# Patient Record
Sex: MALE | Race: WHITE | NOT HISPANIC OR LATINO | URBAN - METROPOLITAN AREA
[De-identification: names, ages, dates, MRNs, and addresses within clinical notes are randomized per-mention and may not be internally consistent; named-entity substitution may affect disease eponyms.]

---

## 2017-12-12 ENCOUNTER — OFFICE VISIT (OUTPATIENT)
Dept: URGENT CARE | Facility: CLINIC | Age: 27
End: 2017-12-12

## 2017-12-12 VITALS
HEART RATE: 70 BPM | DIASTOLIC BLOOD PRESSURE: 64 MMHG | WEIGHT: 171.2 LBS | SYSTOLIC BLOOD PRESSURE: 140 MMHG | OXYGEN SATURATION: 100 % | RESPIRATION RATE: 14 BRPM | TEMPERATURE: 98 F | BODY MASS INDEX: 21.97 KG/M2 | HEIGHT: 74 IN

## 2017-12-12 DIAGNOSIS — K64.5 THROMBOSED EXTERNAL HEMORRHOID: ICD-10-CM

## 2017-12-12 PROCEDURE — 46320 REMOVAL OF HEMORRHOID CLOT: CPT | Performed by: NURSE PRACTITIONER

## 2017-12-12 ASSESSMENT — ENCOUNTER SYMPTOMS
DIZZINESS: 0
BLOOD IN STOOL: 1
ABDOMINAL PAIN: 0
NAUSEA: 0
FLANK PAIN: 0
VOMITING: 0
ORTHOPNEA: 0
DIARRHEA: 0
FEVER: 0
CHILLS: 0
HEADACHES: 0
BACK PAIN: 1
CONSTIPATION: 0

## 2017-12-12 NOTE — PROGRESS NOTES
"Subjective:      Rao Demarco is a 27 y.o. male who presents with Hemorrhoids (x1day, swelling and pain around anus, uncomfortable, noticed blood in toilet tissue before experiencing symptoms)            HPI New problem. Patient is 27 year old male with hemorrhoid for 1 day that is painful. He denies constipation, abdominal pain, nausea or vomiting. He has seen some blood in stool and when wiping. He has used preparation H, sitz baths and ice with no relief. No history in past of hemorrhoids.  Penicillins.  No current outpatient prescriptions on file prior to visit.     No current facility-administered medications on file prior to visit.      Social History     Social History   • Marital status: Single     Spouse name: N/A   • Number of children: N/A   • Years of education: N/A     Occupational History   • Not on file.     Social History Main Topics   • Smoking status: Never Smoker   • Smokeless tobacco: Never Used   • Alcohol use Not on file   • Drug use: Unknown   • Sexual activity: Not on file     Other Topics Concern   • Not on file     Social History Narrative   • No narrative on file     family history is not on file.      Review of Systems   Constitutional: Negative for chills and fever.   Cardiovascular: Negative for chest pain and orthopnea.   Gastrointestinal: Positive for blood in stool. Negative for abdominal pain, constipation, diarrhea, nausea and vomiting.   Genitourinary: Negative for flank pain.   Musculoskeletal: Positive for back pain.   Neurological: Negative for dizziness and headaches.          Objective:     /64   Pulse 70   Temp 36.7 °C (98 °F)   Resp 14   Ht 1.88 m (6' 2\")   Wt 77.7 kg (171 lb 3.2 oz)   SpO2 100%   BMI 21.98 kg/m²      Physical Exam   Constitutional: He is oriented to person, place, and time. He appears well-developed and well-nourished. No distress.   Cardiovascular: Normal rate, regular rhythm and normal heart sounds.    No murmur heard.  Pulmonary/Chest: " Effort normal and breath sounds normal. No respiratory distress.   Abdominal: Soft. Bowel sounds are normal. There is no tenderness. There is no CVA tenderness.   Genitourinary: Rectal exam shows external hemorrhoid.         Genitourinary Comments: Thrombosed hemorrhoid noted on exam, tender, taut.   Musculoskeletal: Normal range of motion.   Moves all 4 extremities normally   Neurological: He is alert and oriented to person, place, and time.   Skin: Skin is warm and dry.   Psychiatric: He has a normal mood and affect. His behavior is normal. Thought content normal.   Vitals reviewed.  verbal consent from patient. Area cleansed with betadine and anesthetized with lido+epi. With number 11 blade, incision was made into hemorrhoid and large clot evacuated. Irrigated with NS. Polysporin and gauze to area.             Assessment/Plan:     1. Thrombosed external hemorrhoid       Wound care instructions given;  Differential diagnosis, natural history, supportive care, and indications for immediate follow-up discussed at length.